# Patient Record
Sex: MALE | Race: OTHER | HISPANIC OR LATINO | URBAN - METROPOLITAN AREA
[De-identification: names, ages, dates, MRNs, and addresses within clinical notes are randomized per-mention and may not be internally consistent; named-entity substitution may affect disease eponyms.]

---

## 2021-07-15 ENCOUNTER — OFFICE VISIT (OUTPATIENT)
Dept: URBAN - METROPOLITAN AREA CLINIC 60 | Facility: CLINIC | Age: 71
End: 2021-07-15

## 2021-07-29 ENCOUNTER — OFFICE VISIT (OUTPATIENT)
Dept: URBAN - METROPOLITAN AREA CLINIC 60 | Facility: CLINIC | Age: 71
End: 2021-07-29

## 2021-08-18 LAB
ALBUMIN/GLOBULIN RATIO: (no result)
ALBUMIN: (no result)
ALKALINE PHOSPHATASE: (no result)
ALT: (no result)
AMYLASE: (no result)
AST: (no result)
BILIRUBIN, TOTAL: (no result)
BUN/CREATININE RATIO: (no result)
CALCIUM: (no result)
CARBON DIOXIDE: (no result)
CHLORIDE: (no result)
CREATININE: (no result)
EGFR AFRICAN AMERIC: (no result)
EGFR NON-AFR. AMERI: (no result)
GLOBULIN: (no result)
GLUCOSE: (no result)
LIPASE: (no result)
POTASSIUM: (no result)
PROTEIN, TOTAL: (no result)
SODIUM: (no result)
UREA NITROGEN (BUN): (no result)

## 2021-08-19 ENCOUNTER — OFFICE VISIT (OUTPATIENT)
Dept: URBAN - METROPOLITAN AREA SURGERY CENTER 4 | Facility: SURGERY CENTER | Age: 71
End: 2021-08-19

## 2021-08-23 LAB — PATHOLOGY (INDENTED REPORT): (no result)

## 2021-09-03 ENCOUNTER — OFFICE VISIT (OUTPATIENT)
Dept: URBAN - METROPOLITAN AREA CLINIC 63 | Facility: CLINIC | Age: 71
End: 2021-09-03

## 2021-09-28 ENCOUNTER — OFFICE VISIT (OUTPATIENT)
Dept: URBAN - METROPOLITAN AREA TELEHEALTH 2 | Facility: TELEHEALTH | Age: 71
End: 2021-09-28

## 2021-10-05 ENCOUNTER — OFFICE VISIT (OUTPATIENT)
Dept: URBAN - METROPOLITAN AREA TELEHEALTH 2 | Facility: TELEHEALTH | Age: 71
End: 2021-10-05

## 2021-10-27 ENCOUNTER — OFFICE VISIT (OUTPATIENT)
Dept: URBAN - METROPOLITAN AREA CLINIC 60 | Facility: CLINIC | Age: 71
End: 2021-10-27

## 2021-11-12 ENCOUNTER — LAB OUTSIDE AN ENCOUNTER (OUTPATIENT)
Dept: URBAN - METROPOLITAN AREA CLINIC 121 | Facility: CLINIC | Age: 71
End: 2021-11-12

## 2021-11-17 LAB — PANCREATIC ELASTASE-1: (no result)

## 2022-07-08 ENCOUNTER — WEB ENCOUNTER (OUTPATIENT)
Dept: URBAN - METROPOLITAN AREA CLINIC 63 | Facility: CLINIC | Age: 72
End: 2022-07-08

## 2022-07-09 ENCOUNTER — TELEPHONE ENCOUNTER (OUTPATIENT)
Dept: URBAN - METROPOLITAN AREA CLINIC 121 | Facility: CLINIC | Age: 72
End: 2022-07-09

## 2022-07-09 RX ORDER — OMEPRAZOLE 20 MG/1
ONCE A DAY CAPSULE, DELAYED RELEASE ORAL ONCE A DAY
Refills: 3 | OUTPATIENT
Start: 2021-09-24 | End: 2022-01-14

## 2022-07-10 ENCOUNTER — TELEPHONE ENCOUNTER (OUTPATIENT)
Dept: URBAN - METROPOLITAN AREA CLINIC 121 | Facility: CLINIC | Age: 72
End: 2022-07-10

## 2022-07-10 RX ORDER — SUCRALFATE 1 G/1
TABLET ORAL
Refills: 0 | Status: ACTIVE | COMMUNITY
Start: 2021-07-15

## 2022-07-10 RX ORDER — CHLORTHALIDONE 25 MG/1
TABLET ORAL
Refills: 0 | Status: ACTIVE | COMMUNITY
Start: 2021-07-15

## 2022-07-10 RX ORDER — ATORVASTATIN CALCIUM 40 MG/1
TABLET, FILM COATED ORAL
Refills: 0 | Status: ACTIVE | COMMUNITY
Start: 2021-07-15

## 2022-07-10 RX ORDER — OMEPRAZOLE 20 MG/1
ONCE A DAY CAPSULE, DELAYED RELEASE ORAL ONCE A DAY
Refills: 0 | Status: ACTIVE | COMMUNITY
Start: 2021-07-15

## 2022-07-10 RX ORDER — METOPROLOL TARTRATE 25 MG/1
TABLET, FILM COATED ORAL
Refills: 0 | Status: ACTIVE | COMMUNITY
Start: 2021-07-15

## 2022-07-10 RX ORDER — LISINOPRIL 40 MG/1
TABLET ORAL
Refills: 0 | Status: ACTIVE | COMMUNITY
Start: 2021-07-15

## 2022-07-10 RX ORDER — OMEPRAZOLE 20 MG/1
ONCE A DAY CAPSULE, DELAYED RELEASE ORAL ONCE A DAY
Refills: 0 | Status: ACTIVE | COMMUNITY
Start: 2022-01-14

## 2022-07-13 ENCOUNTER — OFFICE VISIT (OUTPATIENT)
Dept: URBAN - METROPOLITAN AREA CLINIC 63 | Facility: CLINIC | Age: 72
End: 2022-07-13

## 2022-08-23 ENCOUNTER — OFFICE VISIT (OUTPATIENT)
Dept: URBAN - METROPOLITAN AREA CLINIC 60 | Facility: CLINIC | Age: 72
End: 2022-08-23
Payer: COMMERCIAL

## 2022-08-23 ENCOUNTER — WEB ENCOUNTER (OUTPATIENT)
Dept: URBAN - METROPOLITAN AREA CLINIC 60 | Facility: CLINIC | Age: 72
End: 2022-08-23

## 2022-08-23 VITALS
TEMPERATURE: 97.3 F | BODY MASS INDEX: 30.31 KG/M2 | RESPIRATION RATE: 20 BRPM | HEIGHT: 68 IN | WEIGHT: 200 LBS | SYSTOLIC BLOOD PRESSURE: 120 MMHG | HEART RATE: 55 BPM | DIASTOLIC BLOOD PRESSURE: 76 MMHG | OXYGEN SATURATION: 96 %

## 2022-08-23 DIAGNOSIS — K29.60 REFLUX GASTRITIS: ICD-10-CM

## 2022-08-23 DIAGNOSIS — K44.9 DIAPHRAGMATIC HERNIA WITHOUT OBSTRUCTION OR GANGRENE: ICD-10-CM

## 2022-08-23 DIAGNOSIS — Z12.11 COLON CANCER SCREENING: ICD-10-CM

## 2022-08-23 PROCEDURE — 99204 OFFICE O/P NEW MOD 45 MIN: CPT | Performed by: NURSE PRACTITIONER

## 2022-08-23 RX ORDER — METOPROLOL TARTRATE 25 MG/1
TABLET, FILM COATED ORAL
Refills: 0 | Status: ACTIVE | COMMUNITY
Start: 2021-07-15

## 2022-08-23 RX ORDER — FAMOTIDINE 20 MG/1
1 TABLET AT BEDTIME AS NEEDED TABLET, FILM COATED ORAL ONCE A DAY
Qty: 30 | Refills: 2 | OUTPATIENT
Start: 2022-08-23

## 2022-08-23 RX ORDER — ATORVASTATIN CALCIUM 40 MG/1
TABLET, FILM COATED ORAL
Refills: 0 | Status: ACTIVE | COMMUNITY
Start: 2021-07-15

## 2022-08-23 RX ORDER — LISINOPRIL 40 MG/1
TABLET ORAL
Refills: 0 | Status: ACTIVE | COMMUNITY
Start: 2021-07-15

## 2022-08-23 RX ORDER — SUCRALFATE 1 G/1
TABLET ORAL
Refills: 0 | Status: ACTIVE | COMMUNITY
Start: 2021-07-15

## 2022-08-23 RX ORDER — CHLORTHALIDONE 25 MG/1
TABLET ORAL
Refills: 0 | Status: ACTIVE | COMMUNITY
Start: 2021-07-15

## 2022-08-23 NOTE — HPI-TODAY'S VISIT:
7/21  Patient here today complaining of symptom of gastritis and reflux.  Also complaining of epigastric pain.  The pain is related to meal in alcohol consumption.  The pain is chronic but now are worse. It is not associated with any other GI symptoms. Plan: Patient will do diet for gastritis and reflux.  We will have EGD and laboratories to check on pancreas and liver function. 10/21 Patient had recent labs with normal lipase and amylase and CMP.  LFTs are normal, normal bilirubin and alkaline phosphatase patient does have glucose levels 111.  Ultrasound shows a hepatic asteatosis there is a simple cyst of 1.8 by 1.4 cm in the left hepatic lobe, unchanged compared to previous study.  With these findings it is recommended to do an elastography for evaluation of steatosis and fibrosis.  Patient had an upper endoscopy with evidence of normal esophagus, small hiatal hernia, chronic gastritis, duodenitis of the bulb with normal second portion of duodenum.  Pathology report showed normal small bowel, focal chronic gastritis negative for H. pylori and distal esophagus there was evidence of gastric type mucosa with mild chronic inflammation no evidence of intestinal metaplasia.  Patient will need to continue with diet and treatment for reflux.  Will order elastography and will follow in 3-month. Patient with knee surgery on rehab. 8/22 Patient here today for his screening colonoscopy. He never had colonoscopy done, denies any personal or family history of colorectal cancer, or any change of her bowel habits. Positive for hematochezia.  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 Also, medical history of hepatic steatosis and a simple cyst of the liver.  Patient also complaining of symptom of gastritis and reflux.  He has done an EGD a year ago.  He has a PET scan and laboratories pending. Patient will resume diet and treatment for gastritis. He will have colonoscopy, we will follow-up PET scan results, and laboratories results.

## 2022-08-30 ENCOUNTER — LAB OUTSIDE AN ENCOUNTER (OUTPATIENT)
Dept: URBAN - METROPOLITAN AREA CLINIC 60 | Facility: CLINIC | Age: 72
End: 2022-08-30

## 2022-10-07 ENCOUNTER — OFFICE VISIT (OUTPATIENT)
Dept: URBAN - METROPOLITAN AREA SURGERY CENTER 4 | Facility: SURGERY CENTER | Age: 72
End: 2022-10-07

## 2022-10-21 ENCOUNTER — OFFICE VISIT (OUTPATIENT)
Dept: URBAN - METROPOLITAN AREA CLINIC 60 | Facility: CLINIC | Age: 72
End: 2022-10-21

## 2022-10-24 ENCOUNTER — OFFICE VISIT (OUTPATIENT)
Dept: URBAN - METROPOLITAN AREA CLINIC 63 | Facility: CLINIC | Age: 72
End: 2022-10-24

## 2022-10-28 ENCOUNTER — OFFICE VISIT (OUTPATIENT)
Dept: URBAN - METROPOLITAN AREA SURGERY CENTER 4 | Facility: SURGERY CENTER | Age: 72
End: 2022-10-28

## 2022-11-10 ENCOUNTER — ERX REFILL RESPONSE (OUTPATIENT)
Dept: URBAN - METROPOLITAN AREA CLINIC 60 | Facility: CLINIC | Age: 72
End: 2022-11-10

## 2022-11-10 ENCOUNTER — OFFICE VISIT (OUTPATIENT)
Dept: URBAN - METROPOLITAN AREA CLINIC 60 | Facility: CLINIC | Age: 72
End: 2022-11-10

## 2022-11-10 RX ORDER — FAMOTIDINE 20 MG/1
1 TABLET AT BEDTIME AS NEEDED TABLET, FILM COATED ORAL ONCE A DAY
Qty: 30 | Refills: 2 | OUTPATIENT

## 2022-11-10 RX ORDER — FAMOTIDINE 20 MG/1
TAKE ONE TABLET BY MOUTH ONCE AT BEDTIME AS NEEDED TABLET, FILM COATED ORAL
Qty: 30 TABLET | Refills: 2 | OUTPATIENT

## 2022-11-17 ENCOUNTER — CLAIMS CREATED FROM THE CLAIM WINDOW (OUTPATIENT)
Dept: URBAN - METROPOLITAN AREA SURGERY CENTER 4 | Facility: SURGERY CENTER | Age: 72
End: 2022-11-17
Payer: COMMERCIAL

## 2022-11-17 ENCOUNTER — CLAIMS CREATED FROM THE CLAIM WINDOW (OUTPATIENT)
Dept: URBAN - METROPOLITAN AREA CLINIC 4 | Facility: CLINIC | Age: 72
End: 2022-11-17
Payer: COMMERCIAL

## 2022-11-17 DIAGNOSIS — D12.3 BENIGN NEOPLASM OF TRANSVERSE COLON: ICD-10-CM

## 2022-11-17 DIAGNOSIS — D12.2 POLYP OF ASCENDING COLON: ICD-10-CM

## 2022-11-17 DIAGNOSIS — K64.1 GRADE II INTERNAL HEMORRHOIDS: ICD-10-CM

## 2022-11-17 DIAGNOSIS — K62.89 OTHER SPECIFIED DISEASES OF ANUS AND RECTUM: ICD-10-CM

## 2022-11-17 DIAGNOSIS — Z12.11 COLON CANCER SCREENING: ICD-10-CM

## 2022-11-17 PROCEDURE — G8918 PT W/O PREOP ORDER IV AB PRO: HCPCS | Performed by: INTERNAL MEDICINE

## 2022-11-17 PROCEDURE — 88305 TISSUE EXAM BY PATHOLOGIST: CPT | Performed by: PATHOLOGY

## 2022-11-17 PROCEDURE — G8907 PT DOC NO EVENTS ON DISCHARG: HCPCS | Performed by: INTERNAL MEDICINE

## 2022-11-17 PROCEDURE — 45385 COLONOSCOPY W/LESION REMOVAL: CPT | Performed by: INTERNAL MEDICINE

## 2022-11-17 RX ORDER — LISINOPRIL 40 MG/1
TABLET ORAL
Refills: 0 | Status: ACTIVE | COMMUNITY
Start: 2021-07-15

## 2022-11-17 RX ORDER — CHLORTHALIDONE 25 MG/1
TABLET ORAL
Refills: 0 | Status: ACTIVE | COMMUNITY
Start: 2021-07-15

## 2022-11-17 RX ORDER — FAMOTIDINE 20 MG/1
TAKE ONE TABLET BY MOUTH ONCE AT BEDTIME AS NEEDED TABLET, FILM COATED ORAL
Qty: 30 TABLET | Refills: 2 | Status: ACTIVE | COMMUNITY

## 2022-11-17 RX ORDER — ATORVASTATIN CALCIUM 40 MG/1
TABLET, FILM COATED ORAL
Refills: 0 | Status: ACTIVE | COMMUNITY
Start: 2021-07-15

## 2022-11-17 RX ORDER — SUCRALFATE 1 G/1
TABLET ORAL
Refills: 0 | Status: ACTIVE | COMMUNITY
Start: 2021-07-15

## 2022-11-17 RX ORDER — METOPROLOL TARTRATE 25 MG/1
TABLET, FILM COATED ORAL
Refills: 0 | Status: ACTIVE | COMMUNITY
Start: 2021-07-15

## 2022-12-05 ENCOUNTER — OFFICE VISIT (OUTPATIENT)
Dept: URBAN - METROPOLITAN AREA CLINIC 63 | Facility: CLINIC | Age: 72
End: 2022-12-05

## 2022-12-15 ENCOUNTER — DASHBOARD ENCOUNTERS (OUTPATIENT)
Age: 72
End: 2022-12-15

## 2022-12-15 ENCOUNTER — WEB ENCOUNTER (OUTPATIENT)
Dept: URBAN - METROPOLITAN AREA CLINIC 60 | Facility: CLINIC | Age: 72
End: 2022-12-15

## 2022-12-15 ENCOUNTER — OFFICE VISIT (OUTPATIENT)
Dept: URBAN - METROPOLITAN AREA CLINIC 60 | Facility: CLINIC | Age: 72
End: 2022-12-15

## 2022-12-15 VITALS
WEIGHT: 202 LBS | DIASTOLIC BLOOD PRESSURE: 78 MMHG | TEMPERATURE: 98 F | SYSTOLIC BLOOD PRESSURE: 118 MMHG | HEIGHT: 68 IN | OXYGEN SATURATION: 98 % | BODY MASS INDEX: 30.62 KG/M2 | HEART RATE: 68 BPM

## 2022-12-15 RX ORDER — LISINOPRIL 40 MG/1
TABLET ORAL
Refills: 0 | Status: ACTIVE | COMMUNITY
Start: 2021-07-15

## 2022-12-15 RX ORDER — FAMOTIDINE 20 MG/1
TAKE ONE TABLET BY MOUTH ONCE AT BEDTIME AS NEEDED TABLET, FILM COATED ORAL
Qty: 30 TABLET | Refills: 2 | Status: ACTIVE | COMMUNITY

## 2022-12-15 RX ORDER — ATORVASTATIN CALCIUM 40 MG/1
TABLET, FILM COATED ORAL
Refills: 0 | Status: ACTIVE | COMMUNITY
Start: 2021-07-15

## 2022-12-15 RX ORDER — METOPROLOL TARTRATE 25 MG/1
TABLET, FILM COATED ORAL
Refills: 0 | Status: ACTIVE | COMMUNITY
Start: 2021-07-15

## 2022-12-15 RX ORDER — CHLORTHALIDONE 25 MG/1
TABLET ORAL
Refills: 0 | Status: ACTIVE | COMMUNITY
Start: 2021-07-15

## 2022-12-15 NOTE — HPI-TODAY'S VISIT:
7/21  Patient here today complaining of symptom of gastritis and reflux.  Also complaining of epigastric pain.  The pain is related to meal in alcohol consumption.  The pain is chronic but now are worse. It is not associated with any other GI symptoms. Plan: Patient will do diet for gastritis and reflux.  We will have EGD and laboratories to check on pancreas and liver function. 10/21 Patient had recent labs with normal lipase and amylase and CMP.  LFTs are normal, normal bilirubin and alkaline phosphatase patient does have glucose levels 111.  Ultrasound shows a hepatic asteatosis there is a simple cyst of 1.8 by 1.4 cm in the left hepatic lobe, unchanged compared to previous study.  With these findings it is recommended to do an elastography for evaluation of steatosis and fibrosis.  Patient had an upper endoscopy with evidence of normal esophagus, small hiatal hernia, chronic gastritis, duodenitis of the bulb with normal second portion of duodenum.  Pathology report showed normal small bowel, focal chronic gastritis negative for H. pylori and distal esophagus there was evidence of gastric type mucosa with mild chronic inflammation no evidence of intestinal metaplasia.  Patient will need to continue with diet and treatment for reflux.  Will order elastography and will follow in 3-month. Patient with knee surgery on rehab. 8/22 Patient here today for his screening colonoscopy. He never had colonoscopy done, denies any personal or family history of colorectal cancer, or any change of her bowel habits. Positive for hematochezia.  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 Also, medical history of hepatic steatosis and a simple cyst of the liver.  Patient also complaining of symptom of gastritis and reflux.  He has done an EGD a year ago.  He has a PET scan and laboratories pending. Patient will resume diet and treatment for gastritis. He will have colonoscopy, we will follow-up PET scan results, and laboratories results. 12/22 Patient colonoscopy shows evidence of 8 mm polyp into the ascending colon, internal hemorrhoids, and current vascular a erythematosus mucosa in rectum.  Today he is in good general state he has no GI complaints.

## 2023-02-09 ENCOUNTER — ERX REFILL RESPONSE (OUTPATIENT)
Dept: URBAN - METROPOLITAN AREA CLINIC 60 | Facility: CLINIC | Age: 73
End: 2023-02-09

## 2023-02-09 RX ORDER — FAMOTIDINE 20 MG/1
TAKE ONE TABLET BY MOUTH ONCE AT BEDTIME AS NEEDED TABLET, FILM COATED ORAL
Qty: 30 TABLET | Refills: 2 | OUTPATIENT

## 2024-07-17 ENCOUNTER — LAB OUTSIDE AN ENCOUNTER (OUTPATIENT)
Dept: URBAN - METROPOLITAN AREA CLINIC 63 | Facility: CLINIC | Age: 74
End: 2024-07-17

## 2024-07-17 ENCOUNTER — OFFICE VISIT (OUTPATIENT)
Dept: URBAN - METROPOLITAN AREA CLINIC 63 | Facility: CLINIC | Age: 74
End: 2024-07-17
Payer: COMMERCIAL

## 2024-07-17 VITALS
DIASTOLIC BLOOD PRESSURE: 75 MMHG | SYSTOLIC BLOOD PRESSURE: 103 MMHG | OXYGEN SATURATION: 98 % | HEIGHT: 68 IN | HEART RATE: 56 BPM | TEMPERATURE: 97.4 F | BODY MASS INDEX: 30.62 KG/M2 | WEIGHT: 202 LBS

## 2024-07-17 DIAGNOSIS — R13.14 PHARYNGOESOPHAGEAL DYSPHAGIA: ICD-10-CM

## 2024-07-17 DIAGNOSIS — K44.9 DIAPHRAGMATIC HERNIA WITHOUT OBSTRUCTION OR GANGRENE: ICD-10-CM

## 2024-07-17 DIAGNOSIS — E66.3 OVERWEIGHT: ICD-10-CM

## 2024-07-17 DIAGNOSIS — K29.60 REFLUX GASTRITIS: ICD-10-CM

## 2024-07-17 PROBLEM — 57433008: Status: ACTIVE | Noted: 2024-07-17

## 2024-07-17 PROCEDURE — 99214 OFFICE O/P EST MOD 30 MIN: CPT | Performed by: INTERNAL MEDICINE

## 2024-07-17 RX ORDER — CELECOXIB 50 MG/1
CAPSULE ORAL
Qty: 20 EACH | Refills: 0 | Status: ACTIVE | COMMUNITY

## 2024-07-17 RX ORDER — CHLORTHALIDONE 25 MG/1
TABLET ORAL
Refills: 0 | Status: ACTIVE | COMMUNITY
Start: 2021-07-15

## 2024-07-17 RX ORDER — TAMSULOSIN HYDROCHLORIDE 0.4 MG/1
CAPSULE ORAL
Qty: 30 EACH | Refills: 1 | Status: ACTIVE | COMMUNITY

## 2024-07-17 RX ORDER — METHOCARBAMOL TABLETS 500 MG/1
TABLET, COATED ORAL
Qty: 60 EACH | Refills: 2 | Status: ACTIVE | COMMUNITY

## 2024-07-17 RX ORDER — LISINOPRIL 40 MG/1
TABLET ORAL
Qty: 100 TABLET | Status: ACTIVE | COMMUNITY

## 2024-07-17 RX ORDER — METOPROLOL TARTRATE 25 MG/1
TABLET, FILM COATED ORAL
Refills: 0 | Status: ACTIVE | COMMUNITY
Start: 2021-07-15

## 2024-07-17 RX ORDER — FAMOTIDINE 20 MG/1
TAKE ONE TABLET BY MOUTH ONCE AT BEDTIME AS NEEDED TABLET, FILM COATED ORAL
Qty: 30 TABLET | Refills: 2 | Status: ACTIVE | COMMUNITY

## 2024-07-17 RX ORDER — ATORVASTATIN CALCIUM 10 MG/1
TABLET, FILM COATED ORAL
Qty: 90 EACH | Refills: 0 | Status: ACTIVE | COMMUNITY

## 2024-07-17 RX ORDER — DEXLANSOPRAZOLE 30 MG/1
CAPSULE, DELAYED RELEASE ORAL
Qty: 90 CAPSULE | Status: ACTIVE | COMMUNITY

## 2024-07-17 RX ORDER — LISINOPRIL 40 MG/1
TABLET ORAL
Refills: 0 | Status: ACTIVE | COMMUNITY
Start: 2021-07-15

## 2024-07-17 RX ORDER — ATORVASTATIN CALCIUM 40 MG/1
TABLET, FILM COATED ORAL
Refills: 0 | Status: ACTIVE | COMMUNITY
Start: 2021-07-15

## 2024-07-17 RX ORDER — SUCRALFATE 1 G/1
TABLET ORAL
Refills: 0 | Status: ACTIVE | COMMUNITY
Start: 2021-07-15

## 2024-07-17 RX ORDER — OXYCODONE AND ACETAMINOPHEN 5; 325 MG/1; MG/1
TABLET ORAL
Qty: 14 EACH | Refills: 0 | Status: ACTIVE | COMMUNITY

## 2024-07-17 RX ORDER — MELOXICAM 15 MG/1
TABLET ORAL
Qty: 30 EACH | Refills: 0 | Status: ACTIVE | COMMUNITY

## 2024-07-17 RX ORDER — GABAPENTIN 300 MG/1
CAPSULE ORAL
Qty: 90 EACH | Refills: 2 | Status: ACTIVE | COMMUNITY

## 2024-07-17 NOTE — HPI-TODAY'S VISIT:
7/21  Patient here today complaining of symptom of gastritis and reflux.  Also complaining of epigastric pain.  The pain is related to meal in alcohol consumption.  The pain is chronic but now are worse. It is not associated with any other GI symptoms. Plan: Patient will do diet for gastritis and reflux.  We will have EGD and laboratories to check on pancreas and liver function. 10/21 Patient had recent labs with normal lipase and amylase and CMP.  LFTs are normal, normal bilirubin and alkaline phosphatase patient does have glucose levels 111.  Ultrasound shows a hepatic asteatosis there is a simple cyst of 1.8 by 1.4 cm in the left hepatic lobe, unchanged compared to previous study.  With these findings it is recommended to do an elastography for evaluation of steatosis and fibrosis.  Patient had an upper endoscopy with evidence of normal esophagus, small hiatal hernia, chronic gastritis, duodenitis of the bulb with normal second portion of duodenum.  Pathology report showed normal small bowel, focal chronic gastritis negative for H. pylori and distal esophagus there was evidence of gastric type mucosa with mild chronic inflammation no evidence of intestinal metaplasia.  Patient will need to continue with diet and treatment for reflux.  Will order elastography and will follow in 3-month. Patient with knee surgery on rehab. 8/22 Patient here today for his screening colonoscopy. He never had colonoscopy done, denies any personal or family history of colorectal cancer, or any change of her bowel habits. Positive for hematochezia.  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 Also, medical history of hepatic steatosis and a simple cyst of the liver.  Patient also complaining of symptom of gastritis and reflux.  He has done an EGD a year ago.  He has a PET scan and laboratories pending. Patient will resume diet and treatment for gastritis. He will have colonoscopy, we will follow up PET scan results, and laboratories results. 12/22 Patient colonoscopy shows evidence of 8 mm polyp into the ascending colon, internal hemorrhoids, and current vascular an erythematosus mucosa in rectum.  Today he is in good general state he has no GI complaints. 7/24: Patient was personal history of colonic polyps last colonoscopy done in November 2022 was recommended to do a surveillance colonoscopy in 5 years should be done in 2027 unless there is any alarm symptoms.  Patient had at the beginning of 2024 fecal globin by immunohistochemistry that was not detected.  Lipid panel showed a high cholesterol 242 and triglycerides 166 glucose 104 otherwise normal CMP and CBC. Patient with intermittent  dysphagia  with occasional odynophagia, happen with solid and even with water.. Will continue treatment and diet for acid reflux and will avoid use of NSAID. 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 Will do w/u for his dysphagia, seems to be oropharyngeal. Patient with HTN and high cholesterol on treatment.

## 2024-10-16 ENCOUNTER — OFFICE VISIT (OUTPATIENT)
Dept: URBAN - METROPOLITAN AREA CLINIC 63 | Facility: CLINIC | Age: 74
End: 2024-10-16
Payer: COMMERCIAL

## 2024-10-16 VITALS
TEMPERATURE: 97.8 F | WEIGHT: 204 LBS | DIASTOLIC BLOOD PRESSURE: 70 MMHG | HEART RATE: 62 BPM | HEIGHT: 68 IN | SYSTOLIC BLOOD PRESSURE: 120 MMHG | BODY MASS INDEX: 30.92 KG/M2 | OXYGEN SATURATION: 97 %

## 2024-10-16 DIAGNOSIS — K29.60 REFLUX GASTRITIS: ICD-10-CM

## 2024-10-16 DIAGNOSIS — K44.9 DIAPHRAGMATIC HERNIA WITHOUT OBSTRUCTION OR GANGRENE: ICD-10-CM

## 2024-10-16 PROCEDURE — 99213 OFFICE O/P EST LOW 20 MIN: CPT | Performed by: INTERNAL MEDICINE

## 2024-10-16 RX ORDER — LISINOPRIL 40 MG/1
TABLET ORAL
Refills: 0 | Status: ACTIVE | COMMUNITY
Start: 2021-07-15

## 2024-10-16 RX ORDER — TAMSULOSIN HYDROCHLORIDE 0.4 MG/1
CAPSULE ORAL
Qty: 30 EACH | Refills: 1 | Status: ACTIVE | COMMUNITY

## 2024-10-16 RX ORDER — GABAPENTIN 300 MG/1
CAPSULE ORAL
Qty: 90 EACH | Refills: 2 | Status: ACTIVE | COMMUNITY

## 2024-10-16 RX ORDER — DEXLANSOPRAZOLE 30 MG/1
CAPSULE, DELAYED RELEASE ORAL
Qty: 90 CAPSULE | Status: ACTIVE | COMMUNITY

## 2024-10-16 RX ORDER — ATORVASTATIN CALCIUM 10 MG/1
TABLET, FILM COATED ORAL
Qty: 90 EACH | Refills: 0 | Status: ACTIVE | COMMUNITY

## 2024-10-16 RX ORDER — MELOXICAM 15 MG/1
TABLET ORAL
Qty: 30 EACH | Refills: 0 | Status: ACTIVE | COMMUNITY

## 2024-10-16 RX ORDER — ATORVASTATIN CALCIUM 40 MG/1
TABLET, FILM COATED ORAL
Refills: 0 | Status: ACTIVE | COMMUNITY
Start: 2021-07-15

## 2024-10-16 RX ORDER — SUCRALFATE 1 G/1
TABLET ORAL
Refills: 0 | Status: ACTIVE | COMMUNITY
Start: 2021-07-15

## 2024-10-16 RX ORDER — OXYCODONE AND ACETAMINOPHEN 5; 325 MG/1; MG/1
TABLET ORAL
Qty: 14 EACH | Refills: 0 | Status: ACTIVE | COMMUNITY

## 2024-10-16 RX ORDER — METHOCARBAMOL TABLETS 500 MG/1
TABLET, COATED ORAL
Qty: 60 EACH | Refills: 2 | Status: ACTIVE | COMMUNITY

## 2024-10-16 RX ORDER — LISINOPRIL 40 MG/1
TABLET ORAL
Qty: 100 TABLET | Status: ACTIVE | COMMUNITY

## 2024-10-16 RX ORDER — FAMOTIDINE 20 MG/1
TAKE ONE TABLET BY MOUTH ONCE AT BEDTIME AS NEEDED TABLET, FILM COATED ORAL
Qty: 30 TABLET | Refills: 2 | Status: ACTIVE | COMMUNITY

## 2024-10-16 RX ORDER — DEXLANSOPRAZOLE 30 MG/1
1 CAPSULE 1/2 TO 1 HOUR BEFORE MORNING MEAL CAPSULE, DELAYED RELEASE ORAL ONCE A DAY
Qty: 90 | Refills: 1 | OUTPATIENT

## 2024-10-16 RX ORDER — CELECOXIB 50 MG/1
CAPSULE ORAL
Qty: 20 EACH | Refills: 0 | Status: ACTIVE | COMMUNITY

## 2024-10-16 RX ORDER — FAMOTIDINE 20 MG/1
1 TABLET AT BEDTIME AS NEEDED TABLET, FILM COATED ORAL ONCE A DAY
Qty: 90 TABLET | Refills: 1 | OUTPATIENT

## 2024-10-16 RX ORDER — CHLORTHALIDONE 25 MG/1
TABLET ORAL
Refills: 0 | Status: ACTIVE | COMMUNITY
Start: 2021-07-15

## 2024-10-16 RX ORDER — METOPROLOL TARTRATE 25 MG/1
TABLET, FILM COATED ORAL
Refills: 0 | Status: ACTIVE | COMMUNITY
Start: 2021-07-15

## 2024-10-16 NOTE — HPI-TODAY'S VISIT:
7/21  Patient here today complaining of symptom of gastritis and reflux.  Also complaining of epigastric pain.  The pain is related to meal in alcohol consumption.  The pain is chronic but now are worse. It is not associated with any other GI symptoms. Plan: Patient will do diet for gastritis and reflux.  We will have EGD and laboratories to check on pancreas and liver function. 10/21 Patient had recent labs with normal lipase and amylase and CMP.  LFTs are normal, normal bilirubin and alkaline phosphatase patient does have glucose levels 111.  Ultrasound shows a hepatic asteatosis there is a simple cyst of 1.8 by 1.4 cm in the left hepatic lobe, unchanged compared to previous study.  With these findings it is recommended to do an elastography for evaluation of steatosis and fibrosis.  Patient had an upper endoscopy with evidence of normal esophagus, small hiatal hernia, chronic gastritis, duodenitis of the bulb with normal second portion of duodenum.  Pathology report showed normal small bowel, focal chronic gastritis negative for H. pylori and distal esophagus there was evidence of gastric type mucosa with mild chronic inflammation no evidence of intestinal metaplasia.  Patient will need to continue with diet and treatment for reflux.  Will order elastography and will follow in 3-month. Patient with knee surgery on rehab. 8/22 Patient here today for his screening colonoscopy. He never had colonoscopy done, denies any personal or family history of colorectal cancer, or any change of her bowel habits. Positive for hematochezia.  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 Also, medical history of hepatic steatosis and a simple cyst of the liver.  Patient also complaining of symptom of gastritis and reflux.  He has done an EGD a year ago.  He has a PET scan and laboratories pending. Patient will resume diet and treatment for gastritis. He will have colonoscopy, we will follow up PET scan results, and laboratories results. 12/22 Patient colonoscopy shows evidence of 8 mm polyp into the ascending colon, internal hemorrhoids, and current vascular an erythematosus mucosa in rectum.  Today he is in good general state he has no GI complaints. 7/24: Patient was personal history of colonic polyps last colonoscopy done in November 2022 was recommended to do a surveillance colonoscopy in 5 years should be done in 2027 unless there is any alarm symptoms.  Patient had at the beginning of 2024 fecal globin by immunohistochemistry that was not detected.  Lipid panel showed a high cholesterol 242 and triglycerides 166 glucose 104 otherwise normal CMP and CBC. Patient with intermittent  dysphagia  with occasional odynophagia, happen with solid and even with water.. Will continue treatment and diet for acid reflux and will avoid use of NSAID. 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 Will do w/u for his dysphagia, seems to be oropharyngeal. Patient with HTN and high cholesterol on treatment. October 2024: Patient has been seen as a follow-up for dysphagia.  Barium swallow was performed in September 2024 with the impression of a normal double contrast barium esophagram.  Buserelin with impression of no aspiration, normal study recommendation of the diet was regular thin recommendation were given of compensatory strategies as 90 degrees upright, eat slowly, small bolus.  There is no therapy for this 5.  That was not seen during the test, will follow recommendation.  Will continue treatment for acid reflux. Patient with use of NSAID will do  treatment  with PPI and famotidine if symptoms recur, now doing better.

## 2025-02-05 ENCOUNTER — TELEPHONE ENCOUNTER (OUTPATIENT)
Dept: URBAN - METROPOLITAN AREA CLINIC 63 | Facility: CLINIC | Age: 75
End: 2025-02-05

## 2025-03-04 ENCOUNTER — OFFICE VISIT (OUTPATIENT)
Dept: URBAN - METROPOLITAN AREA CLINIC 63 | Facility: CLINIC | Age: 75
End: 2025-03-04
Payer: COMMERCIAL

## 2025-03-04 VITALS
HEART RATE: 71 BPM | BODY MASS INDEX: 29.86 KG/M2 | SYSTOLIC BLOOD PRESSURE: 110 MMHG | TEMPERATURE: 97.7 F | DIASTOLIC BLOOD PRESSURE: 76 MMHG | OXYGEN SATURATION: 97 % | HEIGHT: 68 IN | WEIGHT: 197 LBS

## 2025-03-04 DIAGNOSIS — K29.60 REFLUX GASTRITIS: ICD-10-CM

## 2025-03-04 PROCEDURE — 99214 OFFICE O/P EST MOD 30 MIN: CPT

## 2025-03-04 RX ORDER — DEXLANSOPRAZOLE 30 MG/1
1 CAPSULE 1/2 TO 1 HOUR BEFORE MORNING MEAL CAPSULE, DELAYED RELEASE ORAL ONCE A DAY
Qty: 90 | Refills: 1 | Status: ACTIVE | COMMUNITY

## 2025-03-04 RX ORDER — LISINOPRIL 40 MG/1
TABLET ORAL
Qty: 100 TABLET | Status: ACTIVE | COMMUNITY

## 2025-03-04 RX ORDER — GABAPENTIN 300 MG/1
CAPSULE ORAL
Qty: 90 EACH | Refills: 2 | Status: ACTIVE | COMMUNITY

## 2025-03-04 RX ORDER — ATORVASTATIN CALCIUM 10 MG/1
TABLET, FILM COATED ORAL
Qty: 90 EACH | Refills: 0 | Status: ACTIVE | COMMUNITY

## 2025-03-04 RX ORDER — CELECOXIB 50 MG/1
CAPSULE ORAL
Qty: 20 EACH | Refills: 0 | Status: ACTIVE | COMMUNITY

## 2025-03-04 RX ORDER — METOPROLOL TARTRATE 25 MG/1
TABLET, FILM COATED ORAL
Refills: 0 | Status: ACTIVE | COMMUNITY
Start: 2021-07-15

## 2025-03-04 RX ORDER — FAMOTIDINE 20 MG/1
1 TABLET AT BEDTIME AS NEEDED TABLET, FILM COATED ORAL ONCE A DAY
Qty: 90 TABLET | Refills: 1 | Status: ACTIVE | COMMUNITY

## 2025-03-04 RX ORDER — MELOXICAM 15 MG/1
TABLET ORAL
Qty: 30 EACH | Refills: 0 | Status: ACTIVE | COMMUNITY

## 2025-03-04 RX ORDER — LISINOPRIL 40 MG/1
TABLET ORAL
Refills: 0 | Status: ACTIVE | COMMUNITY
Start: 2021-07-15

## 2025-03-04 RX ORDER — SUCRALFATE 1 G/1
TABLET ORAL
Refills: 0 | Status: ACTIVE | COMMUNITY
Start: 2021-07-15

## 2025-03-04 RX ORDER — ATORVASTATIN CALCIUM 40 MG/1
TABLET, FILM COATED ORAL
Refills: 0 | Status: ACTIVE | COMMUNITY
Start: 2021-07-15

## 2025-03-04 RX ORDER — FAMOTIDINE 20 MG/1
1 TABLET AT BEDTIME AS NEEDED TABLET, FILM COATED ORAL ONCE A DAY
Qty: 90 TABLET | Refills: 1

## 2025-03-04 RX ORDER — TAMSULOSIN HYDROCHLORIDE 0.4 MG/1
CAPSULE ORAL
Qty: 30 EACH | Refills: 1 | Status: ACTIVE | COMMUNITY

## 2025-03-04 RX ORDER — CHLORTHALIDONE 25 MG/1
TABLET ORAL
Refills: 0 | Status: ACTIVE | COMMUNITY
Start: 2021-07-15

## 2025-03-04 RX ORDER — METHOCARBAMOL TABLETS 500 MG/1
TABLET, COATED ORAL
Qty: 60 EACH | Refills: 2 | Status: ACTIVE | COMMUNITY

## 2025-03-04 RX ORDER — OXYCODONE AND ACETAMINOPHEN 5; 325 MG/1; MG/1
TABLET ORAL
Qty: 14 EACH | Refills: 0 | Status: ACTIVE | COMMUNITY

## 2025-03-04 NOTE — HPI-TODAY'S VISIT:
7/24: Patient was personal history of colonic polyps last colonoscopy done in November 2022 was recommended to do a surveillance colonoscopy in 5 years should be done in 2027 unless there is any alarm symptoms.  Patient had at the beginning of 2024 fecal globin by immunohistochemistry that was not detected.  Lipid panel showed a high cholesterol 242 and triglycerides 166 glucose 104 otherwise normal CMP and CBC. Patient with intermittent  dysphagia  with occasional odynophagia, happen with solid and even with water.. Will continue treatment and diet for acid reflux and will avoid use of NSAID. 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 Will do w/u for his dysphagia, seems to be oropharyngeal. Patient with HTN and high cholesterol on treatment. October 2024: Patient has been seen as a follow-up for dysphagia.  Barium swallow was performed in September 2024 with the impression of a normal double contrast barium esophagram.  Buserelin with impression of no aspiration, normal study recommendation of the diet was regular thin recommendation were given of compensatory strategies as 90 degrees upright, eat slowly, small bolus.  There is no therapy for this 5.  That was not seen during the test, will follow recommendation.  Will continue treatment for acid reflux. Patient with use of NSAID will do  treatment  with PPI and famotidine if symptoms recur, now doing better.  3/25 This is a 74 years old male patient with past medical history of dysphagia and acid reflux disease seen in the office visit today for follow-up appointment, patient reports feeling good acid reflux is under control he is still taking famotidine 20 at bedtime, follow a strict GERD diet, he does not consume alcohol, Patient is without any abdominal complaints. No dysphagia, regurgitation, unintentional weight loss, nausea, vomiting, hematemesis or melena. Bowels are moving regularly without blood per rectum. No complaints of bloating. No jaundice, icterus. There have been no new complaints of cough, fever, joint pain or muscle aches. Will follow up 6 mothns.

## 2025-04-14 ENCOUNTER — OFFICE VISIT (OUTPATIENT)
Dept: URBAN - METROPOLITAN AREA CLINIC 63 | Facility: CLINIC | Age: 75
End: 2025-04-14

## 2025-06-18 ENCOUNTER — ERX REFILL RESPONSE (OUTPATIENT)
Dept: URBAN - METROPOLITAN AREA CLINIC 63 | Facility: CLINIC | Age: 75
End: 2025-06-18

## 2025-06-18 RX ORDER — DEXLANSOPRAZOLE 30 MG/1
TAKE 1 CAPSULE BY MOUTH EVERY DAY 30 MINUTES TO 1 HOUR BEFORE MORNING MEAL CAPSULE, DELAYED RELEASE PELLETS ORAL
Qty: 90 CAPSULE | Refills: 1

## 2025-08-29 ENCOUNTER — ERX REFILL RESPONSE (OUTPATIENT)
Dept: URBAN - METROPOLITAN AREA CLINIC 63 | Facility: CLINIC | Age: 75
End: 2025-08-29

## 2025-08-29 RX ORDER — FAMOTIDINE 20 MG/1
TAKE 1 TABLET BY MOUTH EVERY DAY AT BEDTIME AS NEEDED TABLET, FILM COATED ORAL
Qty: 90 TABLET | Refills: 1